# Patient Record
Sex: FEMALE | Race: WHITE
[De-identification: names, ages, dates, MRNs, and addresses within clinical notes are randomized per-mention and may not be internally consistent; named-entity substitution may affect disease eponyms.]

---

## 2018-06-25 ENCOUNTER — HOSPITAL ENCOUNTER (EMERGENCY)
Dept: HOSPITAL 17 - PHED | Age: 83
Discharge: HOME | End: 2018-06-25
Payer: MEDICARE

## 2018-06-25 VITALS
RESPIRATION RATE: 18 BRPM | TEMPERATURE: 97.6 F | DIASTOLIC BLOOD PRESSURE: 91 MMHG | SYSTOLIC BLOOD PRESSURE: 221 MMHG | OXYGEN SATURATION: 97 % | HEART RATE: 84 BPM

## 2018-06-25 VITALS — SYSTOLIC BLOOD PRESSURE: 187 MMHG | HEART RATE: 82 BPM | DIASTOLIC BLOOD PRESSURE: 85 MMHG

## 2018-06-25 VITALS — HEIGHT: 65 IN | WEIGHT: 189.82 LBS | BODY MASS INDEX: 31.63 KG/M2

## 2018-06-25 DIAGNOSIS — R51: ICD-10-CM

## 2018-06-25 DIAGNOSIS — I10: Primary | ICD-10-CM

## 2018-06-25 DIAGNOSIS — F41.8: ICD-10-CM

## 2018-06-25 DIAGNOSIS — M19.90: ICD-10-CM

## 2018-06-25 DIAGNOSIS — Z90.49: ICD-10-CM

## 2018-06-25 DIAGNOSIS — Z86.718: ICD-10-CM

## 2018-06-25 PROCEDURE — 99281 EMR DPT VST MAYX REQ PHY/QHP: CPT

## 2018-06-25 NOTE — PD
HPI


Chief Complaint:  Hypertension


Time Seen by Provider:  00:26


Travel History


International Travel<30 days:  No


Contact w/Intl Traveler<30days:  No


Traveled to known affect area:  No





History of Present Illness


HPI


This is an 83-year-old female who presents to the emergency department with 

high blood pressure.  She says that last night and tonight her blood pressure 

has been elevated, up in the 200s, constant, moderate severity associated with 

a dull headache which has since subsided.  She denies any associated chest 

pain.  She took an extra water pill yesterday in addition to her 5 mg of 

amlodipine that she takes twice daily.  They are going on a cruise tomorrow to 

the [facility] and she wanted to make sure she was safe to go on her cruise.  She 

also tells me that her  has dementia and was part of the reason that she 

came in because he was very anxious about her.





PFSH


Past Medical History


Arthritis:  Yes


Anxiety:  Yes (TAKES VALIUM OCCASIONALLY FOR SLEEP)


Depression:  Yes


Heart Rhythm Problems:  Yes


Cancer:  No


Cardiovascular Problems:  Yes (HX TACHYARRHYTHMIA)


High Cholesterol:  Yes


Diabetes:  No


Diminished Hearing:  No


Deep Vein Thrombosis:  Yes


Endocrine:  No


Genitourinary:  No


Headaches:  Yes


Hepatitis:  No


Hiatal Hernia:  Yes


Hypertension:  Yes


Immune Disorder:  No


Implanted Vascular Access Dvce:  No


Medical other:  No


Musculoskeletal:  Yes (DDD, ARTHRITIS)


Neurologic:  Yes (POST HERPETIC TRIGEMINAL NEURALGIA, HEADACHES)


Psychiatric:  Yes (ANXIETY, CLAUSTRAPHOBIA)


Reproductive:  No


Respiratory:  No


Seizures:  No


Thyroid Disease:  No


Tetanus Vaccination:  < 5 Years


Influenza Vaccination:  Yes


Menopausal:  Yes





Past Surgical History


Abdominal Surgery:  Yes ( CIELO. ING. HERNIA REP.ABD. HERNIA REP. x3, 

CHOLECYSTECTOMY)


AICD:  No


Cardiac Surgery:  No


Cholecystectomy:  Yes


Ear Surgery:  No


Endocrine Surgery:  No


Eye Surgery:  Yes (L CATARACT )


Genitourinary Surgery:  No


Gynecologic Surgery:  Yes (HYSTERECTOMY)


Hysterectomy:  Yes (2000)


Joint Replacement:  No


Neurologic Surgery:  No


Oral Surgery:  No


Pacemaker:  No


Thoracic Surgery:  No


Other Surgery:  Yes (HYSTERECTOMY, CHOLECYSTECTOMY, HERNIA REPAIR)





Social History


Alcohol Use:  Yes (RARELY)


Tobacco Use:  No


Substance Use:  No





Allergies-Medications


(Allergen,Severity, Reaction):  


Coded Allergies:  


     atorvastatin (Verified  Allergy, Severe, MUSCLE PAIN, 3/21/18)


     warfarin (Verified  Allergy, Severe, HIVES, 3/21/18)


     azithromycin (Verified  Adverse Reaction, Severe, abd pain, 3/21/18)


Reported Meds & Prescriptions





Reported Meds & Active Scripts


Active


Amoxicillin 250 Mg Cap 250 Mg PO TID 10 Days


Norvasc (Amlodipine Besylate) 5 Mg Tab 5 Mg PO DAILY


Amoxicillin 250 Mg Cap 250 Mg PO TID


Reported


Ipratropium Nasal 0.06% Spray 1 Spray EACH NARE QID


Trazodone (Trazodone HCl) 50 Mg Tab 50 Mg PO HS


Rosuvastatin (Rosuvastatin Calcium) 10 Mg Tab 10 Mg PO HS


Diazepam 5 Mg Tab 2.5 Mg PO HS PRN


Aspirin 81 Low Dose (Aspirin) 81 Mg Chew 81 Mg CHEW DAILY








Review of Systems


Except as stated in HPI:  all other systems reviewed are Neg





Physical Exam


Narrative


GENERAL:Well appearing, no acute distress


SKIN: Focused skin assessment warm and dry.


HEAD: Atraumatic. Normocephalic. 


EYES: Pupils equal and round.  No injection or drainage. 


ENT:  Moist mucous membranes


NECK: Trachea midline. 


CARDIOVASCULAR: 3 out of 6 systolic murmur over the right upper sternal border.


RESPIRATORY: Clear to auscultation. Breath sounds equal bilaterally. 


GASTROINTESTINAL: Abdomen soft, non-tender, nondistended. 


MUSCULOSKELETAL: No obvious deformities. 


NEUROLOGICAL: Awake and alert. No obvious cranial nerve deficits.   Moving all 

extremities.


PSYCHIATRIC: Appropriate mood and affect; insight and judgment normal.





Data


Data


Last Documented VS





Vital Signs








  Date Time  Temp Pulse Resp B/P (MAP) Pulse Ox O2 Delivery O2 Flow Rate FiO2


 


6/25/18 00:28  82  187/85 (119)    


 


6/25/18 00:28   18  97 Room Air  


 


6/25/18 00:14 97.6       











[facility]


Medical Decision Making


Medical Screen Exam Complete:  Yes


Emergency Medical Condition:  Yes


Interpretation(s)


Afebrile, no tachycardia, hypertensive


Differential Diagnosis


Hypertensive urgency, hypertension, anxiety


Narrative Course


This is an 83-year-old female who presents to the emergency department with 

high blood pressure.  She is going on a cruise tomorrow and was concerned about 

going due to her blood pressure.  She is asymptomatic currently.  Her blood 

pressure reduced some just with sitting and resting in the emergency 

department.  I suspect a lot of this is secondary to anxiety leading up to her 

trip.  I reassured her given she is asymptomatic and I encouraged her to enjoy 

her trip and only to see the doctor if she develops symptoms.  She is very well-

appearing and she will be discharged home.





Diagnosis





 Primary Impression:  


 Hypertension


 Qualified Codes:  I10 - Essential (primary) hypertension


Patient Instructions:  General Instructions





***Additional Instructions:  


If you develop severe chest pain, shortness of breath, sweating, lightheadedness

, dizziness or difficulty breathing return to the emergency department 

immediately.


***Med/Other Pt SpecificInfo:  No Change to Meds


Disposition:  01 DISCHARGE HOME


Condition:  Stable











Catalina Stevens MD Jun 25, 2018 00:39